# Patient Record
Sex: MALE | Race: WHITE | Employment: UNEMPLOYED | URBAN - METROPOLITAN AREA
[De-identification: names, ages, dates, MRNs, and addresses within clinical notes are randomized per-mention and may not be internally consistent; named-entity substitution may affect disease eponyms.]

---
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
## 2022-01-05 NOTE — FLOWSHEET NOTE
[] [address]  [address]    [phone]  F: [phone] [] [address]  [facility] [address]   [address]  P: [phone]  F: [phone] [x] [place]  [address]  P: [phone]  F: [phone] [] [address]  [address] [facility] [address]   [address]   [place]: [phone]  F: [phone] [] [address]  [place]: [phone]   F: [phone]      Physical Therapy Cancel/No Show note    Date: 10/29/2018  Patient: Radha Archer  : 2000  MRN: [id number]    Cancels/No Shows to date: 2    For today's appointment patient:  []  Cancelled  []  Rescheduled appointment  [x]  No-show     Reason given by patient:  []  Patient ill  []  Conflicting appointment  []  No transportation    []  Conflict with work  []  No reason given  []  Weather related  []  Other:      Comments:   Patient attended PT last week and played in his football game last Friday. It was the last game of the patient's football season.     []  Next appointment was confirmed    Electronically signed by: Nestor Mitchell, PT Calm/Appropriate

## 2025-05-03 ENCOUNTER — HOSPITAL ENCOUNTER (EMERGENCY)
Age: 25
Discharge: HOME | End: 2025-05-03
Attending: EMERGENCY MEDICINE
Payer: COMMERCIAL

## 2025-05-03 VITALS
HEART RATE: 104 BPM | DIASTOLIC BLOOD PRESSURE: 96 MMHG | OXYGEN SATURATION: 99 % | BODY MASS INDEX: 40.63 KG/M2 | SYSTOLIC BLOOD PRESSURE: 143 MMHG | RESPIRATION RATE: 18 BRPM | TEMPERATURE: 98.6 F | WEIGHT: 300 LBS | HEIGHT: 72 IN

## 2025-05-03 DIAGNOSIS — L05.01 PILONIDAL ABSCESS: Primary | ICD-10-CM

## 2025-05-03 PROCEDURE — 6370000000 HC RX 637 (ALT 250 FOR IP): Performed by: NURSE PRACTITIONER

## 2025-05-03 PROCEDURE — 99283 EMERGENCY DEPT VISIT LOW MDM: CPT | Performed by: EMERGENCY MEDICINE

## 2025-05-03 RX ORDER — HYDROCODONE BITARTRATE AND ACETAMINOPHEN 5; 325 MG/1; MG/1
1 TABLET ORAL EVERY 6 HOURS PRN
Qty: 12 TABLET | Refills: 0 | Status: SHIPPED | OUTPATIENT
Start: 2025-05-03 | End: 2025-05-06

## 2025-05-03 RX ORDER — CEPHALEXIN 500 MG/1
500 CAPSULE ORAL 3 TIMES DAILY
Qty: 30 CAPSULE | Refills: 0 | Status: SHIPPED | OUTPATIENT
Start: 2025-05-03 | End: 2025-05-13

## 2025-05-03 RX ORDER — DOXYCYCLINE HYCLATE 100 MG
100 TABLET ORAL 2 TIMES DAILY
COMMUNITY

## 2025-05-03 RX ORDER — DOXYCYCLINE HYCLATE 100 MG
100 TABLET ORAL 2 TIMES DAILY
Qty: 6 TABLET | Refills: 0 | Status: SHIPPED | OUTPATIENT
Start: 2025-05-03 | End: 2025-05-06

## 2025-05-03 RX ORDER — HYDROCODONE BITARTRATE AND ACETAMINOPHEN 5; 325 MG/1; MG/1
1 TABLET ORAL ONCE
Status: COMPLETED | OUTPATIENT
Start: 2025-05-03 | End: 2025-05-03

## 2025-05-03 RX ADMIN — CEPHALEXIN 500 MG: 250 CAPSULE ORAL at 21:46

## 2025-05-03 RX ADMIN — HYDROCODONE BITARTRATE AND ACETAMINOPHEN 1 TABLET: 5; 325 TABLET ORAL at 21:46

## 2025-05-03 ASSESSMENT — PAIN DESCRIPTION - ORIENTATION: ORIENTATION: LEFT;RIGHT

## 2025-05-03 ASSESSMENT — ENCOUNTER SYMPTOMS
CONSTIPATION: 0
DIARRHEA: 0
VOMITING: 0
NAUSEA: 0
EYE DISCHARGE: 0
ABDOMINAL PAIN: 0
COUGH: 0
BACK PAIN: 0
SHORTNESS OF BREATH: 0

## 2025-05-03 ASSESSMENT — LIFESTYLE VARIABLES
HOW MANY STANDARD DRINKS CONTAINING ALCOHOL DO YOU HAVE ON A TYPICAL DAY: PATIENT DOES NOT DRINK
HOW OFTEN DO YOU HAVE A DRINK CONTAINING ALCOHOL: NEVER

## 2025-05-03 ASSESSMENT — PAIN DESCRIPTION - DESCRIPTORS: DESCRIPTORS: PRESSURE;SORE

## 2025-05-03 ASSESSMENT — PAIN DESCRIPTION - PAIN TYPE: TYPE: ACUTE PAIN

## 2025-05-03 ASSESSMENT — PAIN DESCRIPTION - LOCATION: LOCATION: BUTTOCKS

## 2025-05-03 ASSESSMENT — PAIN - FUNCTIONAL ASSESSMENT: PAIN_FUNCTIONAL_ASSESSMENT: 0-10

## 2025-05-03 ASSESSMENT — PAIN SCALES - GENERAL: PAINLEVEL_OUTOF10: 8

## 2025-05-04 NOTE — DISCHARGE INSTRUCTIONS
Continue on doxycycline as prescribed by the urgent care yesterday I have added 3 additional days to that treatment,  the antibiotic Keflex as well take both of these together.    Tylenol and ibuprofen over-the-counter as directed for mild to moderate pain.    Norco pain medication for severe pain no drinking alcohol or driving while taking this medication it can be habit-forming and cause constipation if you become constipated please use over-the-counter stool softener with stimulant.    Warm Sitz baths three times a day.   Wash area often with dial soap. Otherwise keep dry.     Return to the ER: Fevers, abdominal pain, blood in the stool or no bowel movements, increased redness warmth or swelling, nausea or vomiting, streaks of red going up the back; or any other concerning symptoms.

## 2025-05-04 NOTE — ED PROVIDER NOTES
[facility] EMERGENCY DEPARTMENT  EMERGENCY DEPARTMENT ENCOUNTER      Pt Name: Rodrick Reno  MRN: [id number]  Birthdate 2000  Date of evaluation: 5/3/2025  Provider: AJAY Viveros CNP  10:47 PM    CHIEF COMPLAINT       Chief Complaint   Patient presents with    Cyst     Area of redness on lower tailbone. Ongoing for several days. UC put him on doxycycline which he has had 4 doses of. Been taking ibuprofen with limited relief. Denies fever or chills. Very uncomfortable to sit or stand         HISTORY OF PRESENT ILLNESS    Rodrick Reno is a 24 y.o. male who presents to the emergency department      This is a nontoxic-appearing 24-year-old male presenting via private auto with his mother, patient was evaluated at local urgent care yesterday, has taken 4 doses of doxycycline for pilonidal abscess without improvement, he denies fevers chills, abdominal pain, nausea or vomiting, has not had any melena or hematochezia, reports normal bowel movements, has had no previous pilonidal cyst or abscesses, has had no recent falls or traumas.  Patient has been using ibuprofen which mildly helps the pain, was concerned due to the continued symptoms despite starting the antibiotic prompting the emergency room visit.    The history is provided by the patient and medical records.       Nursing Notes were reviewed.    REVIEW OF SYSTEMS       Review of Systems   Constitutional:  Negative for chills and fever.   HENT:  Negative for congestion, ear pain and sneezing.    Eyes:  Negative for discharge and visual disturbance.   Respiratory:  Negative for cough and shortness of breath.    Cardiovascular:  Negative for chest pain and palpitations.   Gastrointestinal:  Negative for abdominal pain, constipation, diarrhea, nausea and vomiting.   Genitourinary:  Negative for dysuria and frequency.   Musculoskeletal:  Negative for back pain and neck pain.   Skin:  Negative for rash and wound.        Positive for pilonidal

## 2025-05-04 NOTE — ED PROVIDER NOTES
[facility]  [address]  [place] [address]  Phone: [phone]  Fax: [phone]      Attending Physician Attestation          CHIEF COMPLAINT       Chief Complaint   Patient presents with    Cyst     Area of redness on lower tailbone. Ongoing for several days. UC put him on doxycycline which he has had 4 doses of. Been taking ibuprofen with limited relief. Denies fever or chills. Very uncomfortable to sit or stand       DIAGNOSTIC RESULTS     LABS:  Labs Reviewed - No data to display    All other labs were within normal range or not returned as of this dictation.    RADIOLOGY:  No orders to display         EMERGENCY DEPARTMENT COURSE:   Vitals:    Vitals:    05/03/25 2108   BP: (!) 143/96   Pulse: (!) 104   Resp: 18   Temp: 98.6 °F (37 °C)   TempSrc: Oral   SpO2: 99%   Weight: 136.1 kg (300 lb)   Height: 1.829 m (6')     -------------------------  BP: (!) 143/96, Temp: 98.6 °F (37 °C), Pulse: (!) 104, Respirations: 18             PERTINENT ATTENDING PHYSICIAN COMMENTS:    I performed a history and physical examination of the patient and discussed management with the mid level provider. I reviewed the mid level provider's note and agree with the documented findings and plan of care. Any areas of disagreement are noted on the chart. I was personally present for the key portions of any procedures. I have documented in the chart those procedures where I was not present during the key portions. I have reviewed the emergency nurses triage note. I agree with the chief complaint, past medical history, past surgical history, allergies, medications, social and family history as documented unless otherwise noted below. Documentation of the HPI, Physical Exam and Medical Decision Making performed by mid level providers is based on my personal performance of the HPI, PE and MDM. For Residents, Physician Assistant/ Nurse Practitioner cases/documentation I have personally